# Patient Record
Sex: FEMALE | Race: WHITE | Employment: FULL TIME | URBAN - METROPOLITAN AREA
[De-identification: names, ages, dates, MRNs, and addresses within clinical notes are randomized per-mention and may not be internally consistent; named-entity substitution may affect disease eponyms.]

---

## 2017-03-21 PROBLEM — Z41.1 ELECTIVE PROCEDURE FOR UNACCEPTABLE COSMETIC APPEARANCE: Status: ACTIVE | Noted: 2017-03-21

## 2021-08-10 ENCOUNTER — OFFICE VISIT (OUTPATIENT)
Dept: PODIATRY | Age: 36
End: 2021-08-10
Payer: COMMERCIAL

## 2021-08-10 VITALS — HEIGHT: 65 IN | BODY MASS INDEX: 32.49 KG/M2 | WEIGHT: 195 LBS

## 2021-08-10 DIAGNOSIS — L60.1 ONYCHOLYSIS OF TOENAIL: Primary | ICD-10-CM

## 2021-08-10 PROCEDURE — 99203 OFFICE O/P NEW LOW 30 MIN: CPT | Performed by: PODIATRIST

## 2021-08-10 RX ORDER — ETONOGESTREL AND ETHINYL ESTRADIOL 11.7; 2.7 MG/1; MG/1
INSERT, EXTENDED RELEASE VAGINAL
COMMUNITY
Start: 2021-07-17

## 2021-08-10 RX ORDER — PHENTERMINE HYDROCHLORIDE 37.5 MG/1
37.5 TABLET ORAL
COMMUNITY
Start: 2021-01-19

## 2021-08-10 NOTE — PROGRESS NOTES
[address] [facility] 1448 [id number] [facility] 36.  Dept: [phone]    NEW PATIENT PROGRESS NOTE  Date of patient's visit: 8/10/2021  Patient's Name:  Maribell Lamas YOB: 1985            Patient Care Team:  Tonya Salgado MD as PCP - General (Family Medicine)        Chief Complaint   Patient presents with    Toe Injury     kicked toe against concrete/wood in garage   [address] [place] [address] Patient         HPI:   Maribell Lamas is a 43 y.o. female who presents to the office today complaining of left great toe injury. Symptoms began 2 day(s) ago. Patient relates pain is Present. Pain is rated 8 out of 10 and is described as intermittent. Treatments prior to today's visit include: none. Currently denies F/C/N/V. Pt's primary care physician is Tonya Salgado MD last seen February 9 2021 seen stu wiley     Allergies   Allergen Reactions    Cat Hair Extract     Pantoprazole        History reviewed. No pertinent past medical history. Prior to Admission medications    Medication Sig Start Date End Date Taking? Authorizing Provider   phentermine (ADIPEX-P) 37.5 MG tablet Take 37.5 mg by mouth every morning (before breakfast). 1/19/21  Yes Historical Provider, MD   etonogestrel-ethinyl estradiol (NUVARING) 0.12-0.015 MG/24HR vaginal ring INSERT 1 RING VAGINALLY AND LEAVE IN FOR 3 WEEKS, THEN REMOVE RING FOR 1 WEEK, THEN INSERT A NEW RING 7/17/21  Yes Historical Provider, MD   ascorbic acid (VITAMIN C) 1000 MG tablet Take 1,000 mg by mouth daily 2/9/21  Yes Historical Provider, MD   Prenatal MV-Min-Fe Fum-FA-DHA (PRENATAL 1 PO) Take by mouth  Patient not taking: Reported on 8/10/2021    Historical Provider, MD   methylPREDNISolone (MEDROL, MILES,) 4 MG tablet Day 1 post-op please take all 6 tabs at one time; Then follow package directions for days 2-6.   Patient not taking: Reported on 8/10/2021 11/11/13   Antonella Julien MD       Past Surgical History:   Procedure Laterality Date    COSMETIC SURGERY  6-2013,3-2014,4-2015    Petar Kenny  4-3-14    Laser Dr. Jose Luis Kenny  03/20/2017    [place]add [place] Dr. Jose Luis Kenny  03/25/2019    Susy Patel- Dr. Janette Mckay    PRE-MALIGNANT / BENIGN SKIN LESION EXCISION  2-11-15    Lesions times 3 Anterior Chest- Dr. Janette Mckay    PRE-MALIGNANT / BENIGN SKIN LESION EXCISION  03/20/2017    Shave Excison of Lesions times 3  Forehead Dr. Janette Mckay       History reviewed. No pertinent family history. Social History     Tobacco Use    Smoking status: Never Smoker    Smokeless tobacco: Never Used   Substance Use Topics    Alcohol use: Yes     Comment: mixed drinks       Review of Systems    Review of Systems:   History obtained from chart review and the patient  General ROS: negative for - chills, fatigue, fever, night sweats or weight gain  Constitutional: Negative for chills, diaphoresis, fatigue, fever and unexpected weight change. Musculoskeletal: Positive for arthralgias, gait problem and joint swelling. Neurological ROS: negative for - behavioral changes, confusion, headaches or seizures. Negative for weakness and numbness. Dermatological ROS: negative for - mole changes, rash  Cardiovascular: Negative for leg swelling. Gastrointestinal: Negative for constipation, diarrhea, nausea and vomiting. Lower Extremity Physical Examination:   Vitals: There were no vitals filed for this visit. General: AAO x 3 in NAD. Dermatologic Exam:  Left hallux nail is partially attached proximally to nail bed. No drainage noted. No signs of infection    Musculoskeletal:     1st MPJ ROM decreased, Bilateral.  Muscle strength 5/5, Bilateral.  Pain present upon palpation of left hallux. Medial longitudinal arch, Bilateral WNL.   Ankle ROM WNL,Bilateral.    Dorsally contracted digits absent digits 1-5 Bilateral.     Vascular: DP and PT pulses palpable 2/4, Bilateral.  CFT <3 seconds, Bilateral.  Hair growth present to the level of the digits, Bilateral.  Edema absent, Bilateral.  Varicosities absent, Bilateral. Erythema absent, Bilateral    Neurological: Sensation intact to light touch to level of digits, Bilateral.  Protective sensation intact 10/10 sites via 5.07/10g [place]iseyda Monofilament, Bilateral.  negative Tinel's, Bilateral.  negative Valleix sign, Bilateral.      Integument: Warm, dry, supple, Bilateral.  Open lesion absent, Bilateral.  Interdigital maceration absent to web spaces 1-4, Bilateral.    Fissures absent, Bilateral.     Asessment: Patient is a 43 y.o. female with:    Diagnosis Orders   1. Onycholysis of toenail           Plan: Patient examined and evaluated. Current condition and treatment options discussed in detail. Discussed conservative and surgical options with the patient. Advised pt to consider nail avulsion if pain worsens. At this time she would like to wait and see how the nail grows in. Pt to monitor daily for infection. All labs were reviewed and all imagining including the above findings were reviewed PRIOR to the patients arrival and with the patient today. Previous patient encounter was reviewed. Encounters from the patients other medical providers were reviewed and noted. Time was spent educating the patient and their families/caregivers on proper care of the feet and ankles. All the above diagnosis were addressed at todays visit and all questions were answered. A total of 30 minutes was spent with this patients encounter which included charting after the patients visit      Verbal and written instructions given to patient. Contact office with any questions/problems/concerns. RTC in 2month(s).     8/10/2021    Electronically signed by Gerard Mcdaniel DPM on 8/10/2021 at 4:41 PM  8/10/2021